# Patient Record
Sex: FEMALE | Race: WHITE | NOT HISPANIC OR LATINO | URBAN - METROPOLITAN AREA
[De-identification: names, ages, dates, MRNs, and addresses within clinical notes are randomized per-mention and may not be internally consistent; named-entity substitution may affect disease eponyms.]

---

## 2023-01-23 ENCOUNTER — OFFICE (OUTPATIENT)
Dept: URBAN - METROPOLITAN AREA CLINIC 105 | Facility: CLINIC | Age: 48
End: 2023-01-23

## 2023-01-23 VITALS
SYSTOLIC BLOOD PRESSURE: 114 MMHG | OXYGEN SATURATION: 87 % | DIASTOLIC BLOOD PRESSURE: 67 MMHG | WEIGHT: 236 LBS | HEIGHT: 66 IN | HEART RATE: 111 BPM

## 2023-01-23 DIAGNOSIS — Z72.0 TOBACCO USE: ICD-10-CM

## 2023-01-23 DIAGNOSIS — K76.0 FATTY (CHANGE OF) LIVER, NOT ELSEWHERE CLASSIFIED: ICD-10-CM

## 2023-01-23 DIAGNOSIS — R06.2 WHEEZING: ICD-10-CM

## 2023-01-23 DIAGNOSIS — Z80.0 FAMILY HISTORY OF MALIGNANT NEOPLASM OF DIGESTIVE ORGANS: ICD-10-CM

## 2023-01-23 DIAGNOSIS — Z86.010 PERSONAL HISTORY OF COLONIC POLYPS: ICD-10-CM

## 2023-01-23 DIAGNOSIS — Z87.19 PERSONAL HISTORY OF OTHER DISEASES OF THE DIGESTIVE SYSTEM: ICD-10-CM

## 2023-01-23 DIAGNOSIS — R10.30 LOWER ABDOMINAL PAIN, UNSPECIFIED: ICD-10-CM

## 2023-01-23 DIAGNOSIS — K58.1 IRRITABLE BOWEL SYNDROME WITH CONSTIPATION: ICD-10-CM

## 2023-01-23 DIAGNOSIS — R09.02 HYPOXEMIA: ICD-10-CM

## 2023-01-23 DIAGNOSIS — R11.2 NAUSEA WITH VOMITING, UNSPECIFIED: ICD-10-CM

## 2023-01-23 PROCEDURE — 99204 OFFICE O/P NEW MOD 45 MIN: CPT

## 2023-01-23 NOTE — SERVICENOTES
– Please go to the emergency room at [facility], [place] for further evaluation of severe abdominal pain and nausea/vomiting
– We will obtain records from prior endoscopies
– Quit smoking
– Follow-up in 1 month

## 2023-01-23 NOTE — SERVICEHPINOTES
Adore Glaser   is seen for an initial visit today. Patient presents for follow-up of diverticulitis, colitis and nausea/vomiting. Patient states that last night she had severe pain that started on the left lower side and is now throughout her entire stomach. Pain is rated 10 out of 10 in severity. She had a similar episode a couple weeks ago and was diagnosed with diverticulitis. She states that she was recently admitted to [facility] 12/2022 for colitis. She was also a recently admitted to [place] for syncope. Her pain feels similar to her prior diverticulitis episodes. She denies blood in stool. She takes Linzess 290 mcg daily for constipation. She denies fevers or shortness of breath. She had a history of pulmonary embolism 16 years ago and takes Xarelto. She states that she has not missed any doses. Also with nausea/vomiting has not been able to keep anything down. She denies hematemesis. She states that she had an EGD and colonoscopy with Dr. Chi a few weeks ago. She had a dilation with her upper endoscopy. Her colonoscopy she had several polyps removed. She reports improvement in her dysphagia after dilation. She takes Nexium daily for GERD. She smokes 1 pack/day. She has a family history of colon cancer in maternal grandfather. She states that her mother and father both had diverticulitis.otoniel Angel reportedly had a colonoscopy in 2021 with polyps. Also with a history of irritable bowel syndrome. She previously had an EGD with Dr. Sierra 11/2014 for dysphagia and GERD which showed a minimal Schatzki ring that was dilated with 54 Thai dilator and normal stomach and duodenum.Patient had a colonoscopy with Dr. Sierra 6/2015 with normal terminal ileum and normal colon. Random colon biopsies were taken.
otoniel Khanna was seen by Dr. Sierra 9/2018 for follow-up of GERD, constipation and abdominal pain. Her Linzess was increased to 290 mcg daily and her Nexium was increased to twice daily. Patient was recently admitted to [facility] 1/9 - 1/10/2023 for syncope. Patient with migraines and negative orthostatics. CMP was normal. Liver tests with ALT 42 and AST 33. CBC with anemia with hemoglobin 10.6, hematocrit 35 and MCV 76. Lipase normal. Troponin negative. She had a CTA of the head and neck without acute findings or stenosis. CTA chest/abdomen/pelvis with no acute arterial abnormality. Chest x-ray normal.